# Patient Record
(demographics unavailable — no encounter records)

---

## 2024-12-05 NOTE — HISTORY OF PRESENT ILLNESS
[FreeTextEntry1] : 67 yo male with nephrolithiasis.  Seen in the ED 4 days ago with severe left flank pain found on CT scan to have a 4-1/2 mm left UVJ stone causing obstruction.  Also noted to have multiple bilateral intrarenal stones.  States he has passed numerous stones with his first stone passed at age 24.  He has not required any surgeries.  States he has done a 24-hour urine collection previously but was told that there were no abnormalities.

## 2024-12-05 NOTE — ASSESSMENT
[FreeTextEntry1] : 66-year-old male with left obstructing ureteral stone.  He will start tamsulosin for medical expulsive therapy.  He has history of recurrent nephrolithiasis.  States he has undergone 24-hour urine collection.  He will follow-up in 2 weeks for renal ultrasound and KUB and we will review his 24-hour urine collection at that time.  Plan Reviewed images of patient CT abdomen pelvis and discussed results with patient demonstrating a 5 mm left UVJ stone with bilateral intrarenal stones measuring 1 to 4 mm in size Urinalysis Urine culture Tamsulosin 0.4 mg daily for medical expulsive therapy prescription sent Will obtain 24-hour urine collection results Follow-up in 2 weeks for renal ultrasound and KUB   Patient is being seen today for evaluation and management of a chronic and longitudinal ongoing condition and I am of the primary treating physician.

## 2024-12-19 NOTE — HISTORY OF PRESENT ILLNESS
[FreeTextEntry1] : 67 yo male with nephrolithiasis.  Seen in the ED 4 days ago with severe left flank pain found on CT scan to have a 4-1/2 mm left UVJ stone causing obstruction.  Also noted to have multiple bilateral intrarenal stones.  States he has passed numerous stones with his first stone passed at age 24.  He has not required any surgeries.  States he has done a 24-hour urine collection previously but was told that there were no abnormalities.  However, I checked [employer] data base and there is no results.  He has not seen the stone pass but he states he feels as if it has passed.  His renal ultrasound done today did not demonstrate any hydronephrosis but shows bilateral intrarenal stones.  We discussed dietary modifications for stone prevention and the benefits of undergoing 24-hour urine

## 2024-12-19 NOTE — ASSESSMENT
[FreeTextEntry1] : 66-year-old male with left obstructing ureteral stone.  He was started on tamsulosin for medical spots of therapy and likely passed his stone.  His renal ultrasound today did not demonstrate any hydronephrosis.  Regarding his recurrent nephrolithiasis we discussed dietary modification for stone prevention.  He also undergo comprehensive metabolic evaluation starting with a 24-hour urine collection to undergo medical management of his stone disease  Plan  I have reviewed images of patient's renal us and discussed results with patient demonstrating no hydronephrosis and bilateral intrarenal stones Urinalysis reviewed - 1 RBC/HPF. Repeat annually Urine culture reviewed - negative  Can stop Tamsulosin  24 hour urine collection FU after he has submitted 24 hr urine to discuss results   Patient is being seen today for evaluation and management of a chronic and longitudinal ongoing condition and I am of the primary treating physician.